# Patient Record
Sex: FEMALE | Race: OTHER
[De-identification: names, ages, dates, MRNs, and addresses within clinical notes are randomized per-mention and may not be internally consistent; named-entity substitution may affect disease eponyms.]

---

## 2023-09-01 ENCOUNTER — HOSPITAL ENCOUNTER (OUTPATIENT)
Dept: HOSPITAL 76 - SDS | Age: 33
Discharge: HOME | End: 2023-09-01
Attending: SURGERY
Payer: COMMERCIAL

## 2023-09-01 VITALS — DIASTOLIC BLOOD PRESSURE: 70 MMHG | SYSTOLIC BLOOD PRESSURE: 113 MMHG

## 2023-09-01 VITALS — OXYGEN SATURATION: 100 %

## 2023-09-01 DIAGNOSIS — Z32.02: ICD-10-CM

## 2023-09-01 DIAGNOSIS — K80.20: Primary | ICD-10-CM

## 2023-09-01 DIAGNOSIS — Z87.19: ICD-10-CM

## 2023-09-01 LAB — HCG UR QL: NEGATIVE

## 2023-09-01 PROCEDURE — 47562 LAPAROSCOPIC CHOLECYSTECTOMY: CPT

## 2023-09-01 PROCEDURE — 0FT44ZZ RESECTION OF GALLBLADDER, PERCUTANEOUS ENDOSCOPIC APPROACH: ICD-10-PCS | Performed by: SURGERY

## 2023-09-01 PROCEDURE — 81025 URINE PREGNANCY TEST: CPT

## 2023-09-01 NOTE — ANESTHESIA
Pre-Anesthesia VS, & Labs





- Diagnosis





gallstones





- Procedure





lap nichole


Vital Signs: 





                                        











Temp Pulse Resp BP Pulse Ox O2 Flow Rate


 


 36.0 C L  61   16   110/76   99    


 


 09/01/23 12:39  09/01/23 12:39  09/01/23 12:39  09/01/23 12:39  09/01/23 12:39 

 











Height: 5 ft 4 in


Weight (kg): 79.3 kg


Body Mass Index: 29.9


BMI Classification: Overweight





- NPO


>8 hours





- Pregnancy


Is Patient Pregnant?: No





- Lab Results


Lab results reviewed: Yes





Home Medications and Allergies





                                        





No Known Home Medications  07/23/23 








Allergies/Adverse Reactions: 


                                    Allergies











Allergy/AdvReac Type Severity Reaction Status Date / Time


 


No Known Drug Allergies Allergy   Verified 07/23/23 12:07














Anes History & Medical History





- Anesthetic History


Anesthesia Complications: reports: No previous complications


Family history of Anesthesia Complications: Denies


Family history of Malignant Hyperthermia: Denies





- Medical History


Cardiovascular: reports: None


Pulmonary: reports: None


Gastrointestinal: reports: None


Urinary: reports: None


Musculoskeletal: reports: None


Endocrine/Autoimmune: reports: None


Skin: reports: None


Smoking Status: Never smoker





Exam


General: Alert, Oriented x3, Cooperative


Dental: WNL


Mouth Opening: 3 Fingerbreadth


Neck Mobility: Normal


Mallampati classification: II


Thyromental Distance: 4-6 cm


Respiratory: Lungs clear


Cardiovascular: Regular rate





Plan


Anesthesia Type: General


Consent for Procedure(s) Verified and Reviewed: Yes


Code Status: Attempt Resuscitation


ASA classification: 1-Healthy patient


Is this case an emergency?: No

## 2023-09-01 NOTE — OPERATIVE REPORT
Operative Report





- General


Procedure Date: 09/01/23


Planned Procedure: 





lap nichole


Pre-Op Diagnosis: chronic cholecystitis and hx gallstone pancreatitis


Procedure Performed: 





lap nichole


Post Op Diagnosis: same





- Procedure Note


Primary Surgeon: akin ronquillo


Anesthesia Technique: General ET tube, Local


Pathology: 





gallbladder and node


Estimated Blood Loss (mL): 2


Drain/Tube Type: Other (none)


Indications: 





as above


Findings: 





large gallbladder.  normal cbd and common hepatic duct


Complications: 





none





- Other


Other Information/Narrative: 





The patient was properly identified, brought to the operating room and placed in

supine position.  Sequential compression devices were placed.  General 

endotracheal anesthesia was induced.  The patient was prepped and draped in a 

sterile fashion and given preoperative antibiotics.  Local anesthetic was given 

to incision areas.  An incision was made in the periumbilical area.  Dissection 

proceeded down to fascia.  The fascia was incised lifted upwards and abdomen 

entered with a Veress needle.  CO2 was insufflated to a pressure of 15.  An 11 

mm trocar followed by a 30 degree scope was placed.  There was no evidence of i

njury from Veress needle or trocar placement.  Under direct vision 2 5 mm 

trochars were placed in the right upper quadrant and an 11 mm trocar was placed 

in the epigastrium.  Body of the gallbladder was retracted anterior.  Lateral 

attachments were partially taken down further mobilizing the gallbladder more 

anterior and away from the duodenum.  The infundibulum of the gallbladder was 

then retracted right lateral and caudad.  With minimal use of cautery a large 

bare cystic plate area or window was carefully created.  The cystic duct was 

inspected from right lateral and left lateral positions. [] The cystic duct was 

then clipped at the gallbladder and 3 times slightly proximal and sharply 

divided.  The cystic artery was clipped at the gallbladder and then 2 times 

slightly proximal and sharply divided.  The gallbladder was mobilized off from 

the bed of the liver with hook cautery.  The gallbladder was placed in Endo 

Catch bag and brought out through the epigastric trocar site.  Hemostasis was 

assured.  Trochars were removed under direct vision.  Fascia at the larger 

trocar sites was closed with figure-of-eight are running 0 Vicryl suture.  

Subcutaneous tissue was irrigated and skin closed with interrupted 4-0 Monocryl.

  Dressings were applied.  Patient tolerated the procedure well was awakened and

 brought to recovery in good condition.

## 2023-09-05 NOTE — ANESTHESIA POST OP EVALUATION
Anesthesia Post Eval





- Post Anesthesia Eval


Vitals: 





                                Last Vital Signs











Temp  36.8 C   09/01/23 18:56


 


Pulse  50 L  09/01/23 18:56


 


Resp  14   09/01/23 18:56


 


BP  113/70   09/01/23 18:56


 


Pulse Ox  100   09/01/23 18:56


 


O2 Flow Rate      











CV Function Including HR & BP: Stable


Pain Control: Satisfactory


Nausea & Vomiting: Negative


Mental Status: Baseline


Respiratory Status: Airway Patent


Hydration Status: Satisfactory


Anesthesia Complications: None